# Patient Record
Sex: MALE | Race: WHITE | NOT HISPANIC OR LATINO | Employment: FULL TIME | URBAN - METROPOLITAN AREA
[De-identification: names, ages, dates, MRNs, and addresses within clinical notes are randomized per-mention and may not be internally consistent; named-entity substitution may affect disease eponyms.]

---

## 2024-02-22 ENCOUNTER — OFFICE VISIT (OUTPATIENT)
Dept: URGENT CARE | Facility: CLINIC | Age: 29
End: 2024-02-22
Payer: COMMERCIAL

## 2024-02-22 VITALS
HEIGHT: 75 IN | BODY MASS INDEX: 35.43 KG/M2 | WEIGHT: 285 LBS | RESPIRATION RATE: 18 BRPM | DIASTOLIC BLOOD PRESSURE: 94 MMHG | SYSTOLIC BLOOD PRESSURE: 143 MMHG | TEMPERATURE: 99 F | OXYGEN SATURATION: 96 % | HEART RATE: 107 BPM

## 2024-02-22 DIAGNOSIS — R03.0 ELEVATED BLOOD PRESSURE READING IN OFFICE WITHOUT DIAGNOSIS OF HYPERTENSION: ICD-10-CM

## 2024-02-22 DIAGNOSIS — S61.111A LACERATION OF RIGHT THUMB WITHOUT FOREIGN BODY WITH DAMAGE TO NAIL, INITIAL ENCOUNTER: Primary | ICD-10-CM

## 2024-02-22 PROCEDURE — 12042 INTMD RPR N-HF/GENIT2.6-7.5: CPT | Mod: S$GLB,,, | Performed by: PHYSICIAN ASSISTANT

## 2024-02-22 PROCEDURE — 90715 TDAP VACCINE 7 YRS/> IM: CPT | Mod: S$GLB,,, | Performed by: PHYSICIAN ASSISTANT

## 2024-02-22 PROCEDURE — 90471 IMMUNIZATION ADMIN: CPT | Mod: S$GLB,,, | Performed by: PHYSICIAN ASSISTANT

## 2024-02-22 PROCEDURE — 99204 OFFICE O/P NEW MOD 45 MIN: CPT | Mod: 25,S$GLB,, | Performed by: PHYSICIAN ASSISTANT

## 2024-02-22 RX ORDER — SULFAMETHOXAZOLE AND TRIMETHOPRIM 800; 160 MG/1; MG/1
1 TABLET ORAL 2 TIMES DAILY
Qty: 10 TABLET | Refills: 0 | Status: SHIPPED | OUTPATIENT
Start: 2024-02-22 | End: 2024-02-27

## 2024-02-22 RX ORDER — OXYCODONE AND ACETAMINOPHEN 5; 325 MG/1; MG/1
1 TABLET ORAL EVERY 6 HOURS PRN
Qty: 10 TABLET | Refills: 0 | Status: SHIPPED | OUTPATIENT
Start: 2024-02-22

## 2024-02-22 NOTE — PROGRESS NOTES
"Subjective:      Patient ID: Sunil Damico is a 28 y.o. male.    Vitals:  height is 6' 3" (1.905 m) and weight is 129.3 kg (285 lb). His oral temperature is 98.5 °F (36.9 °C). His blood pressure is 143/94 (abnormal) and his pulse is 107. His respiration is 18 and oxygen saturation is 96%.     Chief Complaint: Laceration (Right thumb /)    Roc is a 28 y.o. male presents for evaluation of laceration of right thumb.  This injury occurred about 30 minutes ago.  Patient states that the right thumb was cut by a braided wire.  Patient does not remember when his last tetanus shot was.  Patient is right-handed.  Denies any other concerns at this time. Not a work injury.     Laceration   The incident occurred less than 1 hour ago. The laceration is located on the Right hand. The pain is at a severity of 5/10. His tetanus status is unknown.       Constitution: Negative for chills, sweating, fatigue and fever.   HENT:  Negative for ear pain, congestion and sore throat.    Neck: Negative for neck pain and neck stiffness.   Cardiovascular:  Negative for chest pain, leg swelling, palpitations and sob on exertion.   Eyes:  Negative for eye itching, eye pain and eye redness.   Respiratory:  Negative for cough, sputum production and shortness of breath.    Gastrointestinal:  Negative for abdominal pain, nausea, vomiting and diarrhea.   Genitourinary:  Negative for dysuria, frequency, urgency, flank pain and hematuria.   Musculoskeletal:  Positive for pain. Negative for joint pain.   Skin:  Positive for laceration. Negative for color change and rash.   Neurological:  Negative for dizziness, passing out, headaches, disorientation and numbness.   Psychiatric/Behavioral:  Negative for disorientation.       Objective:     Physical Exam   Constitutional: He is oriented to person, place, and time. He appears well-developed. No distress.   HENT:   Head: Normocephalic and atraumatic.   Ears:   Right Ear: External ear normal.   Left Ear: " External ear normal.   Eyes: Conjunctivae are normal. Right eye exhibits no discharge. Left eye exhibits no discharge. No scleral icterus.   Pulmonary/Chest: Effort normal. No stridor. No respiratory distress. He has no wheezes.   Musculoskeletal:        Hands:    Neurological: He is alert and oriented to person, place, and time.   Skin: Skin is not diaphoretic.   Psychiatric: His behavior is normal. Judgment and thought content normal.   Nursing note and vitals reviewed.            Assessment:     1. Laceration of right thumb without foreign body with damage to nail, initial encounter    2. Elevated blood pressure reading in office without diagnosis of hypertension        Plan:     Discussed that we do not have x-ray at the clinic.  This was cut on a non son metal wire.  No visible foreign body.  Based on mechanism and exam, it is unlikely to be fracture, patient agrees and feels that it is not fractured and is ok with foregoing x-ray.  Wound cleaned well in clinic, sutures placed, patient tolerated procedure well.  Patient unsure when last tetanus was but it is possible it has been more than 10 years, so would like to update today in clinic.  - Discussed home wound care (apply antibiotic ointment three times daily, wash gently with soap and water after 24 hours, keep bandaged, monitor for signs of infection, attention not to aggravate- pt wife is a nurse and aware of wound care), tx options, and discussed follow-up.    Regarding nail laceration, it extends slightly onto nail and presumably nail bed.  Sutured skin only and left nail alone, it is well approximated.  Instructed follow-up at home.    Laceration of right thumb without foreign body with damage to nail, initial encounter  -     (In Office Administered) Tdap Vaccine  -     sulfamethoxazole-trimethoprim 800-160mg (BACTRIM DS) 800-160 mg Tab; Take 1 tablet by mouth 2 (two) times daily. for 5 days  Dispense: 10 tablet; Refill: 0  -      oxyCODONE-acetaminophen (PERCOCET) 5-325 mg per tablet; Take 1 tablet by mouth every 6 (six) hours as needed for Pain.  Dispense: 10 tablet; Refill: 0  -     Laceration Repair    Elevated blood pressure reading in office without diagnosis of hypertension      Patient Instructions   - Rest.    - Drink plenty of fluids.    - Acetaminophen (tylenol) or Ibuprofen (advil,motrin) as directed as needed for fever/pain. Avoid tylenol if you have a history of liver disease. Do not take ibuprofen if you have a history of GI bleeding, kidney disease, or if you take blood thinners.     - oxycodone-acetaminophen is a narcotic pain medication only to be taken as needed for severe pain.  This medication contains acetaminophen/Tylenol.  Do not exceed more than 4000 mg of acetaminophen daily.  - Please be aware as we discussed that narcotics can be addictive.   - I have given you a limited quantity to take as it is needed at this time. However take it sparingly and only when needed.  - Do not operate machinery or drive on this medication.      - Keep the wound clean and dry.  Keep the wound completely dry for 24 hours.  - Wash daily with soap and water after 24 hours.    - Change dressing daily.  The dressing placed today can stay on for 24 hours.  - No soaking the wound (No swimming, no bath, no dishwashing).  - The sutures should be removed in 10-14 days.    - You have been given an antibiotic (bactrim) to prevent infection.     - Please complete the antibiotic as directed on the bottle.   - If you are female and on oral birth control pills, use additional methods to prevent pregnancy while on antibiotics and for one cycle after.   - you can take otc probiotic to limit upset stomach    - watch for signs of infection including increased redness, swelling, discharge, increased pain, red streaking, fever.  Seek medical attention immediately if these occur.    - Follow up with your PCP or specialty clinic as directed in the next 1-2  weeks if not improved or as needed.  You can call [phone] to schedule an appointment with the appropriate provider.    - Go to the ER or seek medical attention immediately if you develop new or worsening symptoms.     - You must understand that you have received an Urgent Care treatment only and that you may be released before all of your medical problems are known or treated.   - You, the patient, will arrange for follow up care as instructed.   - If your condition worsens or fails to improve we recommend that you receive another evaluation at the ER immediately or contact your PCP to discuss your concerns or return here.    Elevated Blood Pressure  Your blood pressure was elevated during your visit to the urgent care.  It was not so high that immediate care was needed but it is recommended that you monitor your blood pressure over the next week or two to make sure that it is not staying elevated.  Please have your blood pressure taken 2-3 times daily at different times of the day.  Write all of those blood pressures down and record the time that they were taken.  Keep all that information and take it with you to see your Primary Care Physician.  If your blood pressure is consistently above 140/90 you will need to follow up with your PCP more quickly

## 2024-02-22 NOTE — PROCEDURES
Laceration Repair    Date/Time: 2/22/2024 9:30 AM    Performed by: Abdifatah Payne PA-C  Authorized by: Abdifatah Payne PA-C  Consent Done: Yes  Consent: Verbal consent obtained.  Consent given by: patient  Patient identity confirmed: verbally with patient  Body area: upper extremity  Location details: right thumb  Laceration length: 3 cm  Foreign bodies: no foreign bodies  Tendon involvement: none  Nerve involvement: none  Anesthesia: digital block    Anesthesia:  Local Anesthetic: lidocaine 1% without epinephrine  Anesthetic total: 3 mL  Preparation: Patient was prepped and draped in the usual sterile fashion.  Irrigation solution: saline  Irrigation method: syringe  Amount of cleaning: extensive (Cleaned with chlorhexidine and water, irrigated under tap, then 60 cc saline syringe with irrigation cap.  Cleaned with Betadine prior to procedure and with peroxide soaked gauze during and after procedure.)  Debridement: none  Degree of undermining: none  Skin closure: 4-0 nylon  Number of sutures: 10  Technique: simple  Approximation: close  Approximation difficulty: simple  Dressing: antibiotic ointment and dressing applied  Patient tolerance: Patient tolerated the procedure well with no immediate complications

## 2024-02-22 NOTE — PATIENT INSTRUCTIONS
- Rest.    - Drink plenty of fluids.    - Acetaminophen (tylenol) or Ibuprofen (advil,motrin) as directed as needed for fever/pain. Avoid tylenol if you have a history of liver disease. Do not take ibuprofen if you have a history of GI bleeding, kidney disease, or if you take blood thinners.     - oxycodone-acetaminophen is a narcotic pain medication only to be taken as needed for severe pain.  This medication contains acetaminophen/Tylenol.  Do not exceed more than 4000 mg of acetaminophen daily.  - Please be aware as we discussed that narcotics can be addictive.   - I have given you a limited quantity to take as it is needed at this time. However take it sparingly and only when needed.  - Do not operate machinery or drive on this medication.      - Keep the wound clean and dry.  Keep the wound completely dry for 24 hours.  - Wash daily with soap and water after 24 hours.    - Change dressing daily.  The dressing placed today can stay on for 24 hours.  - No soaking the wound (No swimming, no bath, no dishwashing).  - The sutures should be removed in 10-14 days.    - You have been given an antibiotic (bactrim) to prevent infection.     - Please complete the antibiotic as directed on the bottle.   - If you are female and on oral birth control pills, use additional methods to prevent pregnancy while on antibiotics and for one cycle after.   - you can take otc probiotic to limit upset stomach    - watch for signs of infection including increased redness, swelling, discharge, increased pain, red streaking, fever.  Seek medical attention immediately if these occur.    - Follow up with your PCP or specialty clinic as directed in the next 1-2 weeks if not improved or as needed.  You can call [phone] to schedule an appointment with the appropriate provider.    - Go to the ER or seek medical attention immediately if you develop new or worsening symptoms.     - You must understand that you have received an Urgent Care  treatment only and that you may be released before all of your medical problems are known or treated.   - You, the patient, will arrange for follow up care as instructed.   - If your condition worsens or fails to improve we recommend that you receive another evaluation at the ER immediately or contact your PCP to discuss your concerns or return here.    Elevated Blood Pressure  Your blood pressure was elevated during your visit to the urgent care.  It was not so high that immediate care was needed but it is recommended that you monitor your blood pressure over the next week or two to make sure that it is not staying elevated.  Please have your blood pressure taken 2-3 times daily at different times of the day.  Write all of those blood pressures down and record the time that they were taken.  Keep all that information and take it with you to see your Primary Care Physician.  If your blood pressure is consistently above 140/90 you will need to follow up with your PCP more quickly